# Patient Record
Sex: MALE | Race: WHITE | NOT HISPANIC OR LATINO | Employment: FULL TIME | URBAN - METROPOLITAN AREA
[De-identification: names, ages, dates, MRNs, and addresses within clinical notes are randomized per-mention and may not be internally consistent; named-entity substitution may affect disease eponyms.]

---

## 2020-09-23 PROBLEM — M42.00 SCHEUERMANN'S KYPHOSIS: Status: ACTIVE | Noted: 2020-09-23

## 2020-09-30 PROBLEM — R35.0 FREQUENCY OF MICTURITION: Status: ACTIVE | Noted: 2020-09-30

## 2020-09-30 PROBLEM — R68.82 DECREASED LIBIDO: Status: ACTIVE | Noted: 2020-09-30

## 2020-11-02 PROBLEM — N50.89 SCROTAL MASS: Status: ACTIVE | Noted: 2020-11-02

## 2023-01-19 PROBLEM — I42.9 MYOCARDIOPATHY (HCC): Status: ACTIVE | Noted: 2023-01-19

## 2023-01-19 PROBLEM — I25.10 CORONARY ARTERY DISEASE INVOLVING NATIVE CORONARY ARTERY OF NATIVE HEART: Status: ACTIVE | Noted: 2023-01-19

## 2023-01-19 PROBLEM — F32.2 CURRENT SEVERE EPISODE OF MAJOR DEPRESSIVE DISORDER WITHOUT PSYCHOTIC FEATURES (HCC): Status: ACTIVE | Noted: 2023-01-19

## 2023-01-19 PROBLEM — F41.9 ANXIETY: Status: ACTIVE | Noted: 2023-01-19

## 2024-04-03 ENCOUNTER — RESEARCH ENCOUNTER (OUTPATIENT)
Dept: RESEARCH | Facility: MEDICAL CENTER | Age: 54
End: 2024-04-03

## 2024-04-04 NOTE — RESEARCH NOTE
Patient has signed the [facility] consent form. [facility] consent form still pending. Sent follow-up message.

## 2024-05-29 PROBLEM — Z72.0 TOBACCO USE: Status: ACTIVE | Noted: 2024-05-29

## 2024-05-29 PROBLEM — R07.9 CHEST PAIN: Status: ACTIVE | Noted: 2024-05-29

## 2024-06-21 PROBLEM — Z72.0 TOBACCO USE: Status: RESOLVED | Noted: 2024-05-29 | Resolved: 2024-06-21
